# Patient Record
Sex: MALE | Race: WHITE | NOT HISPANIC OR LATINO
[De-identification: names, ages, dates, MRNs, and addresses within clinical notes are randomized per-mention and may not be internally consistent; named-entity substitution may affect disease eponyms.]

---

## 2021-10-28 ENCOUNTER — NON-APPOINTMENT (OUTPATIENT)
Age: 56
End: 2021-10-28

## 2021-10-29 PROBLEM — Z00.00 ENCOUNTER FOR PREVENTIVE HEALTH EXAMINATION: Status: ACTIVE | Noted: 2021-10-29

## 2021-11-05 ENCOUNTER — APPOINTMENT (OUTPATIENT)
Dept: NEUROLOGY | Facility: CLINIC | Age: 56
End: 2021-11-05
Payer: COMMERCIAL

## 2021-11-05 VITALS
BODY MASS INDEX: 31.75 KG/M2 | WEIGHT: 209.5 LBS | DIASTOLIC BLOOD PRESSURE: 75 MMHG | SYSTOLIC BLOOD PRESSURE: 110 MMHG | TEMPERATURE: 97.6 F | HEIGHT: 68 IN | HEART RATE: 79 BPM | OXYGEN SATURATION: 96 %

## 2021-11-05 DIAGNOSIS — M54.2 CERVICALGIA: ICD-10-CM

## 2021-11-05 DIAGNOSIS — M43.6 TORTICOLLIS: ICD-10-CM

## 2021-11-05 PROCEDURE — 99204 OFFICE O/P NEW MOD 45 MIN: CPT

## 2021-11-05 NOTE — PHYSICAL EXAM
[FreeTextEntry1] : Neuro Exam\par MS: AAOx3, follows commands, good comprehension, fund of knowledge appears intact, no aphasia, no dysarthria\par CN:  PERRL, EOMI, peripheral vision intact, v1-v3 intact, hearing intact, no facial asymmetry, tongue & uvula midline, shoulder shrug equal strength\par Motor:  5/5 no drift,dec range of motion in neck (flexing, rotation), no abnormal atrophy\par Sensory: Lt/PP intact\par Coord: no tremors\par DTR: 1+\par \par
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
## 2021-11-05 NOTE — ASSESSMENT
[FreeTextEntry1] : \par Pt with hx of ongoing neck pain, worsened about 4 month ago\par \par - no motor/sensory deficits but dec range of motion. would obtain MRI c spine c+/c- to evaluate any cervical - stenosis or cervical disc disease\par - physical therapy referral\par - prn pain meds for now\par - follow up prn after MRI c spine

## 2021-12-30 ENCOUNTER — RESULT REVIEW (OUTPATIENT)
Age: 56
End: 2021-12-30

## 2024-11-05 ENCOUNTER — NON-APPOINTMENT (OUTPATIENT)
Age: 59
End: 2024-11-05

## 2024-11-06 ENCOUNTER — NON-APPOINTMENT (OUTPATIENT)
Age: 59
End: 2024-11-06

## 2024-11-06 ENCOUNTER — APPOINTMENT (OUTPATIENT)
Dept: GASTROENTEROLOGY | Facility: CLINIC | Age: 59
End: 2024-11-06
Payer: COMMERCIAL

## 2024-11-06 VITALS
OXYGEN SATURATION: 98 % | HEIGHT: 68 IN | HEART RATE: 70 BPM | SYSTOLIC BLOOD PRESSURE: 144 MMHG | DIASTOLIC BLOOD PRESSURE: 82 MMHG | BODY MASS INDEX: 31.37 KG/M2 | WEIGHT: 207 LBS

## 2024-11-06 DIAGNOSIS — Z86.39 PERSONAL HISTORY OF OTHER ENDOCRINE, NUTRITIONAL AND METABOLIC DISEASE: ICD-10-CM

## 2024-11-06 DIAGNOSIS — Z85.850 PERSONAL HISTORY OF MALIGNANT NEOPLASM OF THYROID: ICD-10-CM

## 2024-11-06 DIAGNOSIS — Z78.9 OTHER SPECIFIED HEALTH STATUS: ICD-10-CM

## 2024-11-06 DIAGNOSIS — Z12.11 ENCOUNTER FOR SCREENING FOR MALIGNANT NEOPLASM OF COLON: ICD-10-CM

## 2024-11-06 PROCEDURE — 99203 OFFICE O/P NEW LOW 30 MIN: CPT

## 2024-11-06 RX ORDER — ATORVASTATIN CALCIUM 40 MG/1
40 TABLET, FILM COATED ORAL
Refills: 0 | Status: ACTIVE | COMMUNITY

## 2024-11-06 RX ORDER — LEVOTHYROXINE SODIUM 112 UG/1
112 TABLET ORAL
Refills: 0 | Status: ACTIVE | COMMUNITY

## 2025-02-03 ENCOUNTER — RESULT REVIEW (OUTPATIENT)
Age: 60
End: 2025-02-03

## 2025-02-03 ENCOUNTER — APPOINTMENT (OUTPATIENT)
Dept: GASTROENTEROLOGY | Facility: HOSPITAL | Age: 60
End: 2025-02-03